# Patient Record
Sex: FEMALE | Race: WHITE | NOT HISPANIC OR LATINO | Employment: OTHER | URBAN - METROPOLITAN AREA
[De-identification: names, ages, dates, MRNs, and addresses within clinical notes are randomized per-mention and may not be internally consistent; named-entity substitution may affect disease eponyms.]

---

## 2017-01-03 ENCOUNTER — OFFICE VISIT (OUTPATIENT)
Dept: ONCOLOGY | Facility: CLINIC | Age: 82
End: 2017-01-03

## 2017-01-03 VITALS
HEART RATE: 90 BPM | OXYGEN SATURATION: 83 % | DIASTOLIC BLOOD PRESSURE: 75 MMHG | BODY MASS INDEX: 21.99 KG/M2 | TEMPERATURE: 98.2 F | HEIGHT: 60 IN | SYSTOLIC BLOOD PRESSURE: 125 MMHG | WEIGHT: 112 LBS | RESPIRATION RATE: 18 BRPM

## 2017-01-03 DIAGNOSIS — C34.81 MALIGNANT NEOPLASM OF OVERLAPPING SITES OF RIGHT LUNG (HCC): Primary | ICD-10-CM

## 2017-01-03 PROCEDURE — 99215 OFFICE O/P EST HI 40 MIN: CPT | Performed by: INTERNAL MEDICINE

## 2017-01-03 RX ORDER — LEVOFLOXACIN 500 MG/1
500 TABLET, FILM COATED ORAL DAILY
Qty: 7 TABLET | Refills: 0 | Status: SHIPPED | OUTPATIENT
Start: 2017-01-03 | End: 2017-01-09 | Stop reason: SDUPTHER

## 2017-01-03 NOTE — PROGRESS NOTES
Subjective     PROBLEM LIST:  1. [id number] Adenocarcinoma of the right lung  A) patient presented in December 2016 with shortness of breath.  Imaging showed a moderate pleural effusion, and 1300 ml were drained.  Cytology on 12/21/16 showed adenocarcinoma, consistent with a lung primary (TTF1 positive, mammoglobin negative).  2. Atrial fibrillation  3. CAD s/p 4v CABG  4. COPD  5. Mild dementia  6. GERD  7. Hyperlipidemia     CHIEF COMPLAINT: stage IV lung cancer      HISTORY OF PRESENT ILLNESS:  The patient is a 89 y.o. year old female, referred for evaluation of a malignant pleural effusion.    She was admitted to the hospital in December after 2-3 weeks of shortness of breath.  She has lost about 20 lbs over 18 months.  She initially was treated for pneumonia, but further imaging showed an effusion which was drained. She was discharged home on 12/21.  She was seen by Dr. Anthony in follow up on 12/30 - a CXR at that time showed minimal fluid.  A PET scan was ordered but has not yet been scheduled.  Over the weekend she has had a new cough with sputum production.  She says the cough is constant and this is her main complaint right now.  It is affecting her sleep at night and she has pain in her lower ribs from coughing so much.  Her O2 sat at presentation was in the 80s.    REVIEW OF SYSTEMS:  A 14 point review of systems was performed and is negative except as noted above.    Past Medical History   Diagnosis Date   • Atrial fibrillation    • BPV (benign positional vertigo)    • Breast cancer    • CAD (coronary artery disease) with prior MI    • HTN (hypertension)    • Hx of radiation therapy    • Hypercholesteremia        Current Outpatient Prescriptions on File Prior to Visit   Medication Sig Dispense Refill   • acetaminophen (TYLENOL) 325 MG tablet Take 325 mg by mouth Daily As Needed for mild pain (1-3).     • Calcium-Magnesium-Vitamin D (CALCIUM 500 PO) Take 1 tablet by mouth Daily.     • cetirizine  (zyrTEC) 10 MG tablet Take 10 mg by mouth Daily.     • cholecalciferol (VITAMIN D3) 1000 UNITS tablet Take 1,000 Units by mouth Daily.     • coenzyme Q10 100 MG capsule Take 100 mg by mouth Daily.     • diclofenac (VOLTAREN) 1 % gel gel Apply 4 g topically 4 (Four) Times a Day As Needed.     • digoxin (LANOXIN) 125 MCG tablet Take 125 mcg by mouth Daily.     • diltiaZEM CD (CARDIZEM CD) 120 MG 24 hr capsule Take 120 mg by mouth Daily As Needed.     • donepezil (ARICEPT) 10 MG tablet Take 10 mg by mouth Every Evening.     • furosemide (LASIX) 20 MG tablet Alternate 20mg po daily with 40mg po daily     • GARLIC OIL PO Take 1 tablet by mouth Daily.     • HYDROcodone-acetaminophen (NORCO) 5-325 MG per tablet Take 0.5 tablets by mouth Every 6 (Six) Hours As Needed.     • hydrOXYzine (ATARAX) 10 MG tablet Take 10 mg by mouth At Night As Needed.     • levalbuterol (XOPENEX) 0.63 MG/3ML nebulizer solution Take 1 ampule by nebulization 2 (Two) Times a Day.     • metoclopramide (REGLAN) 5 MG tablet Take 5 mg by mouth 4 (Four) Times a Day Before Meals & at Bedtime.     • mometasone (ASMANEX) 220 MCG/INH inhaler Inhale 1 puff 2 (Two) Times a Day.     • montelukast (SINGULAIR) 10 MG tablet Take 10 mg by mouth Every Night.     • Multiple Vitamins-Minerals (CENTRUM SILVER PO) Take 1 tablet by mouth Daily.     • Polyethylene Glycol 3350 (MIRALAX PO) Take  by mouth As Needed.     • potassium chloride (K-DUR) 10 MEQ CR tablet Take 10 mEq by mouth Daily.     • pravastatin (PRAVACHOL) 40 MG tablet Take 40 mg by mouth Every Night.     • raNITIdine (ZANTAC) 150 MG tablet Take 150 mg by mouth Daily As Needed for heartburn.     • rivaroxaban (XARELTO) 15 MG tablet Take 15 mg by mouth Every Evening.       No current facility-administered medications on file prior to visit.        Allergies   Allergen Reactions   • Benztropine    • Codeine    • Exelon [Rivastigmine Tartrate]    • Ipratropium Bromide Hfa    • Isosorbide Mononitrate  "[Isosorbide]    • Penicillins    • Prednisone    • Remeron [Mirtazapine]    • Stiolto Respimat [Tiotropium Bromide-Olodaterol]    • Sulfa Antibiotics    • Tramadol    • Trazodone And Nefazodone        Past Surgical History   Procedure Laterality Date   • Breast biopsy     • Breast lumpectomy     • Coronary artery bypass graft         Social History     Social History   • Marital status:      Spouse name: N/A   • Number of children: N/A   • Years of education: N/A     Social History Main Topics   • Smoking status: Former Smoker   • Smokeless tobacco: None   • Alcohol use 1.2 oz/week     2 Glasses of wine per week   • Drug use: No   • Sexual activity: Defer     Other Topics Concern   • None     Social History Narrative   25 pk yr smoking history, quit 15 yrs ago.  .  Retired.  Lives with her 2 sons.    Family History   Problem Relation Age of Onset   • Breast cancer Neg Hx    • Ovarian cancer Neg Hx    Father - MI    Objective     Visit Vitals   • /75   • Pulse 90   • Temp 98.2 °F (36.8 °C) (Temporal Artery )   • Resp 18   • Ht 60\" (152.4 cm)   • Wt 112 lb (50.8 kg)   • SpO2 (!) 83%   • BMI 21.87 kg/m2     Performance Status: 2  General: thin elderly female in no acute distress  Neuro: alert and oriented  HEENT: sclera anicteric, oropharynx clear  Lymphatics: no cervical, supraclavicular, or axillary adenopathy  Cardiovascular: regular rate and rhythm, no murmurs  Lungs: diffuse tight wheezing throughout  Abdomen: soft, nontender, nondistended.  No palpable organomegaly  Extremeties: no lower extremity edema  Skin: no rashes, lesions, bruising, or petechiae  Psych: mood and affect appropriate    Labs: CMP on 12/21/16 shows alk phos 124, Creatinine 0.6.  CBC hgb 11.6, plt 289.    Imaging: I personally reviewed the CT images of the chest.  This shows a moderate sized right pleural effusion.  There is collapse of the right lower lobe but no obvious mass otherwise.  The chest x-ray from 12/30/2016 " shows minimal fluid collection at the right base    Assessment/Plan     Sneha Montero is a 89 y.o. year old female with newly diagnosed stage IV adenocarcinoma of the lung.  I reviewed this diagnosis with her and her family today.  The stage IV designation is based on the malignant cells found in the pleural effusion.  There is no surgical option for management of this cancer, other than possible placement of a Pleurx catheter if the fluid reaccumulated rapidly.  In terms of management of the pleural effusion I think we will need to monitor and see how quickly this re-accumulates.  If it is rapid, I think a Pleurx would be the best option.  However if the fluid is slow to reaccumulate she could potentially have serial thoracentesis.    We discussed systemic therapy options.  Given her age I would be reluctant to consider using chemotherapy, as I think there is a very good chance he could make her quality of life worse than no treatment at all.  It is possible that her tumor has an EGFR, ALK, or ROS 1 abnormality; in that circumstance systemic therapy may be a reasonable option, but only if it helps to minimize symptoms related to the disease.  Her son asked about immune therapy.  We discussed that this is a treatment where these medications to activate the immune system to fight the cancer.  It can be told well tolerated in some cases but there are significant side effects to be aware of.  If there is sufficient tissue available from her pleural fluid, we will send molecular testing to determine if she has any of the above abnormalities.  Also in terms of completing her staging we will plan to do the PET scan as previously ordered, and had an MRI of the brain.      I will see her back in 2 weeks to review all of these results.    Her current symptoms of cough, wheezing may be related to bronchitis.  It is possible that fluid reaccumulation and/or lung atelectasis can cause the symptoms as well, but this seems  unlikely based on Fridays x-ray.  I prescribed a cough suppressant as well as levofloxacin.  She will call if her symptoms do not improve.           Gwen Cruz MD    1/3/2017

## 2017-01-03 NOTE — MR AVS SNAPSHOT
Sneha LENA Montero   1/3/2017 1:15 PM   Office Visit    Dept Phone:  [phone]   Encounter #:  [id number]    Provider:  Gwen Cruz MD   Department:  [facility] HEMATOLOGY  AND ONCOLOGY                Your Full Care Plan              Your Updated Medication List          This list is accurate as of: 1/3/17  3:11 PM.  Always use your most recent med list.                acetaminophen 325 MG tablet   Commonly known as:  TYLENOL       CALCIUM 500 PO       CENTRUM SILVER PO       cetirizine 10 MG tablet   Commonly known as:  zyrTEC       cholecalciferol 1000 UNITS tablet   Commonly known as:  VITAMIN D3       coenzyme Q10 100 MG capsule       diclofenac 1 % gel gel   Commonly known as:  VOLTAREN       digoxin 125 MCG tablet   Commonly known as:  LANOXIN       diltiaZEM  MG 24 hr capsule   Commonly known as:  CARDIZEM CD       donepezil 10 MG tablet   Commonly known as:  ARICEPT       furosemide 20 MG tablet   Commonly known as:  LASIX   Alternate 20mg po daily with 40mg po daily       GARLIC OIL PO       HYDROcod Polst-CPM Polst ER 10-8 MG/5ML ER suspension   Commonly known as:  TUSSIONEX PENNKINETIC ER   Take 5 mL by mouth Every 12 (Twelve) Hours As Needed (cough).       HYDROcodone-acetaminophen 5-325 MG per tablet   Commonly known as:  NORCO       hydrOXYzine 10 MG tablet   Commonly known as:  ATARAX       levalbuterol 0.63 MG/3ML nebulizer solution   Commonly known as:  XOPENEX       levoFLOXacin 500 MG tablet   Commonly known as:  LEVAQUIN   Take 1 tablet by mouth Daily.       metoclopramide 5 MG tablet   Commonly known as:  REGLAN       MIRALAX PO       mometasone 220 MCG/INH inhaler   Commonly known as:  ASMANEX       montelukast 10 MG tablet   Commonly known as:  SINGULAIR       potassium chloride 10 MEQ CR tablet   Commonly known as:  K-DUR       pravastatin 40 MG tablet   Commonly known as:  PRAVACHOL       raNITIdine 150 MG tablet   Commonly known as:  ZANTAC  "      rivaroxaban 15 MG tablet   Commonly known as:  XARELTO               Instructions     None    Patient Instructions History      MyChart Signup     [facility] [employer] allows you to send messages to your doctor, view your test results, renew your prescriptions, schedule appointments, and more. To sign up, go to [employer] and click on the Sign Up Now link in the New User? box. Enter your [employer] Activation Code exactly as it appears below along with the last four digits of your Social Security Number and your Date of Birth () to complete the sign-up process. If you do not sign up before the expiration date, you must request a new code.    [employer] Activation Code: [email or web address]  Expires: 2017  2:44 PM    If you have questions, you can email [email or web address] or call [phone] to talk to our [employer] staff. Remember, [employer] is NOT to be used for urgent needs. For medical emergencies, dial 911.               Other Info from Your Visit           Your appointments     Date & Time Provider Appointment Department    2017 11:45 AM DEIDRA Cruz MD FOLLOW UP [facility] HEMATOLOGY  AND ONCOLOGY    2017  1:45 PM EST RAD TECH [facility] Chest X-Ray [facility]    2017  2:00 PM DEIDRA Anthony MD Follow Up [facility]    Arrive 15 minutes prior to appointment.        [facility] HEMATOLOGY  AND ONCOLOGY  [place]  [address]  [facility] [address] [phone] [facility]  [address]  [facility] [address] [phone]          Vital Signs     Blood Pressure Pulse Temperature Respirations Height Weight    125/75 90 98.2 °F (36.8 °C) (Temporal Artery ) 18 60\" (152.4 cm) 112 lb (50.8 kg)    Oxygen Saturation Body Mass Index Smoking Status             83% " 21.87 kg/m2 Former Smoker         Problems and Diagnoses Noted     Malignant neoplasm of overlapping sites of right lung

## 2017-01-03 NOTE — LETTER
January 3, 2017     Timothy Ceabllos MD  [address]  [address]  [facility] [address]    Patient: Sneha Montero   YOB: 1920   Date of Visit: 1/3/2017       Dear Dr. Tucker MD:    Sneha Montero was in my office today. Below is a copy of my note.    If you have questions, please do not hesitate to call me. I look forward to following Sneha along with you.         Sincerely,        Gwen Cruz MD        CC: Cayetano Anthony MD      Subjective     PROBLEM LIST:  1. [id number] Adenocarcinoma of the right lung  A) patient presented in December 2016 with shortness of breath.  Imaging showed a moderate pleural effusion, and 1300 ml were drained.  Cytology on 12/21/16 showed adenocarcinoma, consistent with a lung primary (TTF1 positive, mammoglobin negative).  2. Atrial fibrillation  3. CAD s/p 4v CABG  4. COPD  5. Mild dementia  6. GERD  7. Hyperlipidemia     CHIEF COMPLAINT: stage IV lung cancer      HISTORY OF PRESENT ILLNESS:  The patient is a 89 y.o. year old female, referred for evaluation of a malignant pleural effusion.    She was admitted to the hospital in December after 2-3 weeks of shortness of breath.  She has lost about 20 lbs over 18 months.  She initially was treated for pneumonia, but further imaging showed an effusion which was drained. She was discharged home on 12/21.  She was seen by Dr. Anthony in follow up on 12/30 - a CXR at that time showed minimal fluid.  A PET scan was ordered but has not yet been scheduled.  Over the weekend she has had a new cough with sputum production.  She says the cough is constant and this is her main complaint right now.  It is affecting her sleep at night and she has pain in her lower ribs from coughing so much.  Her O2 sat at presentation was in the 80s.    REVIEW OF SYSTEMS:  A 14 point review of systems was performed and is negative except as noted above.    Past Medical History   Diagnosis Date   • Atrial fibrillation    • BPV  (benign positional vertigo)    • Breast cancer    • CAD (coronary artery disease) with prior MI    • HTN (hypertension)    • Hx of radiation therapy    • Hypercholesteremia        Current Outpatient Prescriptions on File Prior to Visit   Medication Sig Dispense Refill   • acetaminophen (TYLENOL) 325 MG tablet Take 325 mg by mouth Daily As Needed for mild pain (1-3).     • Calcium-Magnesium-Vitamin D (CALCIUM 500 PO) Take 1 tablet by mouth Daily.     • cetirizine (zyrTEC) 10 MG tablet Take 10 mg by mouth Daily.     • cholecalciferol (VITAMIN D3) 1000 UNITS tablet Take 1,000 Units by mouth Daily.     • coenzyme Q10 100 MG capsule Take 100 mg by mouth Daily.     • diclofenac (VOLTAREN) 1 % gel gel Apply 4 g topically 4 (Four) Times a Day As Needed.     • digoxin (LANOXIN) 125 MCG tablet Take 125 mcg by mouth Daily.     • diltiaZEM CD (CARDIZEM CD) 120 MG 24 hr capsule Take 120 mg by mouth Daily As Needed.     • donepezil (ARICEPT) 10 MG tablet Take 10 mg by mouth Every Evening.     • furosemide (LASIX) 20 MG tablet Alternate 20mg po daily with 40mg po daily     • GARLIC OIL PO Take 1 tablet by mouth Daily.     • HYDROcodone-acetaminophen (NORCO) 5-325 MG per tablet Take 0.5 tablets by mouth Every 6 (Six) Hours As Needed.     • hydrOXYzine (ATARAX) 10 MG tablet Take 10 mg by mouth At Night As Needed.     • levalbuterol (XOPENEX) 0.63 MG/3ML nebulizer solution Take 1 ampule by nebulization 2 (Two) Times a Day.     • metoclopramide (REGLAN) 5 MG tablet Take 5 mg by mouth 4 (Four) Times a Day Before Meals & at Bedtime.     • mometasone (ASMANEX) 220 MCG/INH inhaler Inhale 1 puff 2 (Two) Times a Day.     • montelukast (SINGULAIR) 10 MG tablet Take 10 mg by mouth Every Night.     • Multiple Vitamins-Minerals (CENTRUM SILVER PO) Take 1 tablet by mouth Daily.     • Polyethylene Glycol 3350 (MIRALAX PO) Take  by mouth As Needed.     • potassium chloride (K-DUR) 10 MEQ CR tablet Take 10 mEq by mouth Daily.     • pravastatin  "(PRAVACHOL) 40 MG tablet Take 40 mg by mouth Every Night.     • raNITIdine (ZANTAC) 150 MG tablet Take 150 mg by mouth Daily As Needed for heartburn.     • rivaroxaban (XARELTO) 15 MG tablet Take 15 mg by mouth Every Evening.       No current facility-administered medications on file prior to visit.        Allergies   Allergen Reactions   • Benztropine    • Codeine    • Exelon [Rivastigmine Tartrate]    • Ipratropium Bromide Hfa    • Isosorbide Mononitrate [Isosorbide]    • Penicillins    • Prednisone    • Remeron [Mirtazapine]    • Stiolto Respimat [Tiotropium Bromide-Olodaterol]    • Sulfa Antibiotics    • Tramadol    • Trazodone And Nefazodone        Past Surgical History   Procedure Laterality Date   • Breast biopsy     • Breast lumpectomy     • Coronary artery bypass graft         Social History     Social History   • Marital status:      Spouse name: N/A   • Number of children: N/A   • Years of education: N/A     Social History Main Topics   • Smoking status: Former Smoker   • Smokeless tobacco: None   • Alcohol use 1.2 oz/week     2 Glasses of wine per week   • Drug use: No   • Sexual activity: Defer     Other Topics Concern   • None     Social History Narrative   25 pk yr smoking history, quit 15 yrs ago.  .  Retired.  Lives with her 2 sons.    Family History   Problem Relation Age of Onset   • Breast cancer Neg Hx    • Ovarian cancer Neg Hx    Father - MI    Objective     Visit Vitals   • /75   • Pulse 90   • Temp 98.2 °F (36.8 °C) (Temporal Artery )   • Resp 18   • Ht 60\" (152.4 cm)   • Wt 112 lb (50.8 kg)   • SpO2 (!) 83%   • BMI 21.87 kg/m2     Performance Status: 2  General: thin elderly female in no acute distress  Neuro: alert and oriented  HEENT: sclera anicteric, oropharynx clear  Lymphatics: no cervical, supraclavicular, or axillary adenopathy  Cardiovascular: regular rate and rhythm, no murmurs  Lungs: diffuse tight wheezing throughout  Abdomen: soft, nontender, nondistended.  " No palpable organomegaly  Extremeties: no lower extremity edema  Skin: no rashes, lesions, bruising, or petechiae  Psych: mood and affect appropriate    Labs: CMP on 12/21/16 shows alk phos 124, Creatinine 0.6.  CBC hgb 11.6, plt 289.    Imaging: I personally reviewed the CT images of the chest.  This shows a moderate sized right pleural effusion.  There is collapse of the right lower lobe but no obvious mass otherwise.  The chest x-ray from 12/30/2016 shows minimal fluid collection at the right base    Assessment/Plan     Sneha Montero is a 89 y.o. year old female with newly diagnosed stage IV adenocarcinoma of the lung.  I reviewed this diagnosis with her and her family today.  The stage IV designation is based on the malignant cells found in the pleural effusion.  There is no surgical option for management of this cancer, other than possible placement of a Pleurx catheter if the fluid reaccumulated rapidly.  In terms of management of the pleural effusion I think we will need to monitor and see how quickly this re-accumulates.  If it is rapid, I think a Pleurx would be the best option.  However if the fluid is slow to reaccumulate she could potentially have serial thoracentesis.    We discussed systemic therapy options.  Given her age I would be reluctant to consider using chemotherapy, as I think there is a very good chance he could make her quality of life worse than no treatment at all.  It is possible that her tumor has an EGFR, ALK, or ROS 1 abnormality; in that circumstance systemic therapy may be a reasonable option, but only if it helps to minimize symptoms related to the disease.  Her son asked about immune therapy.  We discussed that this is a treatment where these medications to activate the immune system to fight the cancer.  It can be told well tolerated in some cases but there are significant side effects to be aware of.  If there is sufficient tissue available from her pleural fluid, we will send  molecular testing to determine if she has any of the above abnormalities.  Also in terms of completing her staging we will plan to do the PET scan as previously ordered, and had an MRI of the brain.      I will see her back in 2 weeks to review all of these results.    Her current symptoms of cough, wheezing may be related to bronchitis.  It is possible that fluid reaccumulation and/or lung atelectasis can cause the symptoms as well, but this seems unlikely based on Fridays x-ray.  I prescribed a cough suppressant as well as levofloxacin.  She will call if her symptoms do not improve.           Gwen Cruz MD    1/3/2017

## 2017-01-09 ENCOUNTER — DOCUMENTATION (OUTPATIENT)
Dept: ONCOLOGY | Facility: CLINIC | Age: 82
End: 2017-01-09

## 2017-01-09 ENCOUNTER — TELEPHONE (OUTPATIENT)
Dept: ONCOLOGY | Facility: CLINIC | Age: 82
End: 2017-01-09

## 2017-01-09 RX ORDER — LEVOFLOXACIN 500 MG/1
500 TABLET, FILM COATED ORAL DAILY
Qty: 7 TABLET | Refills: 0 | Status: SHIPPED | OUTPATIENT
Start: 2017-01-09 | End: 2017-01-27

## 2017-01-09 NOTE — TELEPHONE ENCOUNTER
----- Message from Giulia Mendez sent at 1/9/2017  1:30 PM EST -----  Regarding: WILVER-SYMPTOMS  Contact: [phone]  Patient's son called patient is hallucinating, extremely mental agitated, sleeplessness, numbness on back of knee, pain in ankle she is taking levinox and taking a steroid could this be side effects from these medications? If so should they be discontinued and given something different.

## 2017-01-09 NOTE — TELEPHONE ENCOUNTER
Spoke with patient's son, Asim. Told him after giving on call MD an update on symptoms Sneha is experiencing- he advised she go to the ER to be evaluated. It is difficult to manage these sx over the phone- whether it be medication related or disease progression. Asim also wanted to discuss reasoning behind patient being on certain meds and possibly make some adjustments. I told him I would let Dr. Cruz know all of this tomorrow when she comes in and it should be her to make any permanent changes in her medication list- he agrees. Son is in [place], but will make arrangements for his mom to go to the ER.

## 2017-01-09 NOTE — PROGRESS NOTES
Called in Levaquin rx to [facility] pharmacy. Electronic rxs currently not going through on Epic.

## 2017-01-09 NOTE — TELEPHONE ENCOUNTER
Spoke with daughter, Anyi. Refill for abx escribed to pharmacy. Told her she would have to come in and  the cough medication rx. She said they still have some left and will call when they run out.

## 2017-01-10 ENCOUNTER — TELEPHONE (OUTPATIENT)
Dept: ONCOLOGY | Facility: CLINIC | Age: 82
End: 2017-01-10

## 2017-01-10 NOTE — TELEPHONE ENCOUNTER
Spoke with daughter. Patient did not go to the ER last night. Daughter and son believed it was unnecessary and they could manage her at home, someone is there around the clock. Family believes it was the Levaquin interacting with another medication causing hallucinations. She took the last dose last night. The psych symptoms have improved today. I told them if it was the Levaquin causing the problem, it should continue getting better the longer she is off that med. Advised them that per Dr. Cruz if her cough hadn't improved with one full course of abx they didn't need to try any more. Told them to not take any more. If it was disease progression causing hallucinations and trouble with balance and ambulation- the upcoming scans would confirm that. Anyi said the open MRI of the head was scheduled, but the PET scan was not. The PET was ordered by Dr. Mcclure office. Daughter is having difficulty communicating with Dr Mcclure office and central scheduling to get the PET scheduled. I will touch base with their office and  Make sure that scan gets scheduled.  Pt has an apt with her PCP Dr Ceballos tomorrow. Advised family again, it is best if a physician can assess her-- and not deal with these symptoms over the phone.

## 2017-01-10 NOTE — TELEPHONE ENCOUNTER
----- Message from Irma Mathur sent at 1/10/2017 11:43 AM EST -----  Regarding: WILVER - SIDE EFFECTS   Contact: [phone]  SUMIT MORENO, DAUGHTER OF PATIENT CALLED  REGARDING THE MEDICATION LEVAQUIN AND SIDE EFFECTS.  PATIENTS COUGH IS BETTER BUT STILL HAS A LOT OF CONGESTION. SHE FINISHED THE LAST OF THE LEVAQUIN LAST NIGHT. WHAT SHE IS EXPERIENCING FOR THE LAST TWO AND A HALF DAYS, SHE IS PARANOID, AND HAVING HALLUCINATIONS , AND SOMETIMES THEY CAN GET HER BACK TO REALITY BUT SHE IS IMAGINING THINGS THAT ARE HAPPENING. THEY ARE WONDERING IF THE LEVAQUIN HAS INTERACTED WITH THE NARCOTIC MEDICATION AND COUGH MEDICINE. SHE IS MANAGEABLE BUT THIS IS ALSO SCARY FOR HER. THEY PICKED UP A NEW COURSE OF LEVAQUIN LAST NIGHT, BUT THEY ARE WANTING TO KNOW IF THERE IS SOMETHING DIFFERENT SINCE THEY FEEL THERE IS  SIDE EFFECTS. SHE ALSO ISN'T SLEEPING EITHER.

## 2017-01-12 ENCOUNTER — HOSPITAL ENCOUNTER (OUTPATIENT)
Dept: PET IMAGING | Facility: HOSPITAL | Age: 82
Discharge: HOME | End: 2017-01-12
Attending: INTERNAL MEDICINE

## 2017-01-12 ENCOUNTER — HOSPITAL ENCOUNTER (OUTPATIENT)
Dept: PET IMAGING | Facility: HOSPITAL | Age: 82
Discharge: HOME | End: 2017-01-12
Attending: INTERNAL MEDICINE | Admitting: INTERNAL MEDICINE

## 2017-01-12 DIAGNOSIS — C34.90 PRIMARY ADENOCARCINOMA OF LUNG, UNSPECIFIED LATERALITY (HCC): ICD-10-CM

## 2017-01-12 LAB — GLUCOSE BLDC GLUCOMTR-MCNC: 90 MG/DL (ref 70–130)

## 2017-01-12 PROCEDURE — 78815 PET IMAGE W/CT SKULL-THIGH: CPT

## 2017-01-12 PROCEDURE — 0 FLUDEOXYGLUCOSE F18 SOLUTION: Performed by: INTERNAL MEDICINE

## 2017-01-12 PROCEDURE — A9552 F18 FDG: HCPCS | Performed by: INTERNAL MEDICINE

## 2017-01-12 PROCEDURE — 82962 GLUCOSE BLOOD TEST: CPT

## 2017-01-12 RX ADMIN — FLUDEOXYGLUCOSE F18 1 DOSE: 300 INJECTION INTRAVENOUS at 14:32

## 2017-01-13 ENCOUNTER — TELEPHONE (OUTPATIENT)
Dept: ONCOLOGY | Facility: CLINIC | Age: 82
End: 2017-01-13

## 2017-01-13 ENCOUNTER — TELEPHONE (OUTPATIENT)
Dept: CARDIOLOGY | Facility: CLINIC | Age: 82
End: 2017-01-13

## 2017-01-13 NOTE — TELEPHONE ENCOUNTER
PET w/ widely metastatic cancer and recurrent pleural effusion.  Will order a CT guided thoracentesis for next week, get patient back Monday the 23rd and discuss pleurex. Discussed with patients daughter who verbalized understanding.    Cayetano Anthony MD  Pulmonology and Critical Care Medicine  01/13/17 3:45 PM  Electronically Signed

## 2017-01-13 NOTE — TELEPHONE ENCOUNTER
----- Message from Sharyn La sent at 1/13/2017  9:47 AM EST -----  Regarding: WILVER/ PULMONOLOGIST  SUMIT MORENO  HOME      PLEASE CALL SUMIT CONCERNING THIS PATIENT.  (HER MOM)     PLEASE TALK WITH DR PETTIT TO FIND A PULMONOLOGIST.

## 2017-01-13 NOTE — TELEPHONE ENCOUNTER
Patient and family are considering switching pulmonologist, but plan on staying in the same group.

## 2017-01-16 DIAGNOSIS — C34.81 MALIGNANT NEOPLASM OF OVERLAPPING SITES OF RIGHT LUNG (HCC): ICD-10-CM

## 2017-01-16 DIAGNOSIS — R91.8 LUNG INFILTRATE: Primary | ICD-10-CM

## 2017-01-18 ENCOUNTER — OFFICE VISIT (OUTPATIENT)
Dept: ONCOLOGY | Facility: CLINIC | Age: 82
End: 2017-01-18

## 2017-01-18 VITALS
RESPIRATION RATE: 14 BRPM | SYSTOLIC BLOOD PRESSURE: 123 MMHG | BODY MASS INDEX: 21.79 KG/M2 | WEIGHT: 111 LBS | HEIGHT: 60 IN | DIASTOLIC BLOOD PRESSURE: 83 MMHG | HEART RATE: 88 BPM | TEMPERATURE: 98.3 F

## 2017-01-18 DIAGNOSIS — C34.81 MALIGNANT NEOPLASM OF OVERLAPPING SITES OF RIGHT LUNG (HCC): Primary | ICD-10-CM

## 2017-01-18 PROCEDURE — 99214 OFFICE O/P EST MOD 30 MIN: CPT | Performed by: INTERNAL MEDICINE

## 2017-01-18 NOTE — PROGRESS NOTES
"      PROBLEM LIST:  1. [id number] Adenocarcinoma of the right lung  A) patient presented in December 2016 with shortness of breath. Imaging showed a moderate pleural effusion, and 1300 ml were drained. Cytology on 12/21/16 showed adenocarcinoma, consistent with a lung primary (TTF1 positive, mammoglobin negative).  2. Atrial fibrillation  3. CAD s/p 4v CABG  4. COPD  5. Mild dementia  6. GERD  7. Hyperlipidemia     Subjective     HISTORY OF PRESENT ILLNESS:   Sneha Montero returns for follow-up.   Since she was last here she had a PET scan and an MRI of the brain.  The family was contacted by Dr. Anthony regarding the PET scan.  This showed widespread metastatic disease with lesions in the liver as well as abdominal lymph nodes.  The family reports that she has declined over the past few weeks.  She seems much more weak.  She is having more difficulty with breathing.  Her scan showed reaccumulation of pleural effusions.    Past Medical History, Past Surgical History, Social History, Family History have been reviewed and are without significant changes except as mentioned.    Review of Systems   A comprehensive 14 point review of systems was performed and was negative except as mentioned.    Medications:  The current medication list was reviewed in the EMR    ALLERGIES:    Allergies   Allergen Reactions   • Benztropine    • Biaxin [Clarithromycin] Hallucinations   • Codeine    • Exelon [Rivastigmine Tartrate]    • Ipratropium Bromide Hfa    • Isosorbide Mononitrate [Isosorbide]    • Levaquin [Levofloxacin] Hallucinations   • Penicillins    • Prednisone    • Remeron [Mirtazapine]    • Stiolto Respimat [Tiotropium Bromide-Olodaterol]    • Sulfa Antibiotics    • Tramadol    • Trazodone And Nefazodone        Objective      Visit Vitals   • /83   • Pulse 88   • Temp 98.3 °F (36.8 °C)   • Resp 14   • Ht 60\" (152.4 cm)   • Wt 111 lb (50.3 kg)   • BMI 21.68 kg/m2        Performance Status: 3    General: Frail elderly " female in no acute distress  Neuro: alert and oriented, hard of hearing  HEENT: sclera anicteric  Cardiovascular: regular rate and rhythm, no murmurs  Lungs: Decreased breath sounds at the bases bilaterally        Imaging: PET/CT scan shows bilateral pleural effusions, numerous lesions throughout the liver, mesenteric nodes, iliac nodes, cecelia hepatis, and omentum.      Assessment/Plan   Sneha Montero is a 89 y.o. year old female with metastatic adenocarcinoma.  She presented with a pleural effusion and this was originally thought to be a lung primary.  However, additional imaging shows extensive disease in the abdomen raising the possibility of a GI primary.  The MRI of the brain was unremarkable.  Given the findings on PET scan she has much more advanced disease than originally appreciated.  In addition, her decline has become more noticeable to her and her family.  I do not think that systemic therapy would improve her quality of life.  I do not think that interventions to try to extend her life are appropriate.  I think we should see simply focus on symptom management.  It may be that drainage of the pleural fluid collections and possible Pleurx catheter placement would provide some relief for her.  Dr. Anthony has already arranged for therapeutic thoracentesis on Friday.  I discussed with her daughter that it may only be weeks before she passes away.  Whether Pleurx catheter placement would be helpful depends on her performance status and how she's feeling.  I recommended hospice care.  The family is not all in agreement about having hospice involved.  I will not schedule any follow-up but I am happy to help him in any way I can.  They know they can always call with questions or concerns.                  Visit time was 25 minutes, greater than 50% spent in counseling      Gwen Cruz MD  [facility] Hematology and Oncology    1/18/2017          CC:

## 2017-01-18 NOTE — LETTER
January 18, 2017     Timothy Ceballos MD  [address]  [address]  [facility] [address]    Patient: Sneha Montero   YOB: 1920   Date of Visit: 1/18/2017       Dear Dr. Tucker MD:    Sneha Montero was in my office today. Below is a copy of my note.    If you have questions, please do not hesitate to call me. I look forward to following Sneha along with you.         Sincerely,        Gwen Cruz MD        CC: Cayetano Anthony MD          PROBLEM LIST:  1. [id number] Adenocarcinoma of the right lung  A) patient presented in December 2016 with shortness of breath. Imaging showed a moderate pleural effusion, and 1300 ml were drained. Cytology on 12/21/16 showed adenocarcinoma, consistent with a lung primary (TTF1 positive, mammoglobin negative).  2. Atrial fibrillation  3. CAD s/p 4v CABG  4. COPD  5. Mild dementia  6. GERD  7. Hyperlipidemia     Subjective     HISTORY OF PRESENT ILLNESS:   Sneha Montero returns for follow-up.   Since she was last here she had a PET scan and an MRI of the brain.  The family was contacted by Dr. Anthony regarding the PET scan.  This showed widespread metastatic disease with lesions in the liver as well as abdominal lymph nodes.  The family reports that she has declined over the past few weeks.  She seems much more weak.  She is having more difficulty with breathing.  Her scan showed reaccumulation of pleural effusions.    Past Medical History, Past Surgical History, Social History, Family History have been reviewed and are without significant changes except as mentioned.    Review of Systems   A comprehensive 14 point review of systems was performed and was negative except as mentioned.    Medications:  The current medication list was reviewed in the EMR    ALLERGIES:    Allergies   Allergen Reactions   • Benztropine    • Biaxin [Clarithromycin] Hallucinations   • Codeine    • Exelon [Rivastigmine Tartrate]    • Ipratropium Bromide Hfa    • Isosorbide  "Mononitrate [Isosorbide]    • Levaquin [Levofloxacin] Hallucinations   • Penicillins    • Prednisone    • Remeron [Mirtazapine]    • Stiolto Respimat [Tiotropium Bromide-Olodaterol]    • Sulfa Antibiotics    • Tramadol    • Trazodone And Nefazodone        Objective      Visit Vitals   • /83   • Pulse 88   • Temp 98.3 °F (36.8 °C)   • Resp 14   • Ht 60\" (152.4 cm)   • Wt 111 lb (50.3 kg)   • BMI 21.68 kg/m2        Performance Status: 3    General: Frail elderly female in no acute distress  Neuro: alert and oriented, hard of hearing  HEENT: sclera anicteric  Cardiovascular: regular rate and rhythm, no murmurs  Lungs: Decreased breath sounds at the bases bilaterally        Imaging: PET/CT scan shows bilateral pleural effusions, numerous lesions throughout the liver, mesenteric nodes, iliac nodes, cecelia hepatis, and omentum.      Assessment/Plan   Sneha Montero is a 89 y.o. year old female with metastatic adenocarcinoma.  She presented with a pleural effusion and this was originally thought to be a lung primary.  However, additional imaging shows extensive disease in the abdomen raising the possibility of a GI primary.  The MRI of the brain was unremarkable.  Given the findings on PET scan she has much more advanced disease than originally appreciated.  In addition, her decline has become more noticeable to her and her family.  I do not think that systemic therapy would improve her quality of life.  I do not think that interventions to try to extend her life are appropriate.  I think we should see simply focus on symptom management.  It may be that drainage of the pleural fluid collections and possible Pleurx catheter placement would provide some relief for her.  Dr. Anthony has already arranged for therapeutic thoracentesis on Friday.  I discussed with her daughter that it may only be weeks before she passes away.  Whether Pleurx catheter placement would be helpful depends on her performance status and how " she's feeling.  I recommended hospice care.  The family is not all in agreement about having hospice involved.  I will not schedule any follow-up but I am happy to help him in any way I can.  They know they can always call with questions or concerns.                  Visit time was 25 minutes, greater than 50% spent in counseling      Gwen Cruz MD  [facility] Hematology and Oncology    1/18/2017          CC:

## 2017-01-19 ENCOUNTER — HOSPITAL ENCOUNTER (OUTPATIENT)
Dept: CT IMAGING | Facility: HOSPITAL | Age: 82
End: 2017-01-19

## 2017-01-20 ENCOUNTER — HOSPITAL ENCOUNTER (OUTPATIENT)
Dept: CT IMAGING | Facility: HOSPITAL | Age: 82
Discharge: HOME | End: 2017-01-20
Attending: INTERNAL MEDICINE | Admitting: INTERNAL MEDICINE

## 2017-01-20 ENCOUNTER — DOCUMENTATION (OUTPATIENT)
Dept: SOCIAL WORK | Facility: HOSPITAL | Age: 82
End: 2017-01-20

## 2017-01-20 VITALS
OXYGEN SATURATION: 92 % | RESPIRATION RATE: 24 BRPM | DIASTOLIC BLOOD PRESSURE: 86 MMHG | HEART RATE: 73 BPM | SYSTOLIC BLOOD PRESSURE: 143 MMHG | TEMPERATURE: 96.5 F

## 2017-01-20 DIAGNOSIS — R91.8 LUNG INFILTRATE: ICD-10-CM

## 2017-01-20 DIAGNOSIS — C34.81 MALIGNANT NEOPLASM OF OVERLAPPING SITES OF RIGHT LUNG (HCC): ICD-10-CM

## 2017-01-20 LAB
APTT PPP: 24.9 SECONDS (ref 24–31)
INR PPP: 0.91
PLATELET # BLD AUTO: 296 10*3/MM3 (ref 150–450)
PROTHROMBIN TIME: 9.9 SECONDS (ref 9.6–11.5)

## 2017-01-20 PROCEDURE — 75989 ABSCESS DRAINAGE UNDER X-RAY: CPT

## 2017-01-20 PROCEDURE — 85049 AUTOMATED PLATELET COUNT: CPT | Performed by: RADIOLOGY

## 2017-01-20 PROCEDURE — 85730 THROMBOPLASTIN TIME PARTIAL: CPT | Performed by: RADIOLOGY

## 2017-01-20 PROCEDURE — 85610 PROTHROMBIN TIME: CPT | Performed by: RADIOLOGY

## 2017-01-20 RX ORDER — VIT A/VIT C/VIT E/ZINC/COPPER 4296-226
CAPSULE ORAL
COMMUNITY

## 2017-01-20 RX ORDER — LIDOCAINE HYDROCHLORIDE 10 MG/ML
10 INJECTION, SOLUTION INFILTRATION; PERINEURAL ONCE
Status: COMPLETED | OUTPATIENT
Start: 2017-01-20 | End: 2017-01-20

## 2017-01-20 RX ORDER — ALPRAZOLAM 0.25 MG/1
0.25 TABLET ORAL
Status: DISCONTINUED | OUTPATIENT
Start: 2017-01-20 | End: 2017-01-20 | Stop reason: HOSPADM

## 2017-01-20 RX ORDER — SODIUM CHLORIDE 0.9 % (FLUSH) 0.9 %
1-10 SYRINGE (ML) INJECTION AS NEEDED
Status: DISCONTINUED | OUTPATIENT
Start: 2017-01-20 | End: 2017-01-20 | Stop reason: HOSPADM

## 2017-01-20 RX ADMIN — LIDOCAINE HYDROCHLORIDE 10 ML: 10 INJECTION, SOLUTION INFILTRATION; PERINEURAL at 10:05

## 2017-01-20 NOTE — PROGRESS NOTES
Continued Stay Note       Patient Name: Sneha Montero  MRN: [id number]  Today's Date: 1/20/2017    Admit Date: (Not on file)          Discharge Plan       01/20/17 1014    Case Management/Social Work Plan    Plan Social work spoke with Mrs. Montero's family about equipment needs including a hospital bed.  The family said that they have called Dr. Ceballos's office and that they may be considering Hospice.  Hospice would provide durable medical equipment for the patient when required.  The family said they do not require a hospital bed right now but thet are looking at the near future.    Patient/Family In Agreement With Plan yes              Discharge Codes     None            INDIANA Bello

## 2017-01-23 LAB
LAB AP CASE REPORT: NORMAL
Lab: NORMAL
PATH REPORT.FINAL DX SPEC: NORMAL

## 2017-01-27 ENCOUNTER — OFFICE VISIT (OUTPATIENT)
Dept: PULMONOLOGY | Facility: CLINIC | Age: 82
End: 2017-01-27

## 2017-01-27 VITALS
OXYGEN SATURATION: 93 % | TEMPERATURE: 97.3 F | WEIGHT: 112 LBS | RESPIRATION RATE: 16 BRPM | DIASTOLIC BLOOD PRESSURE: 65 MMHG | BODY MASS INDEX: 21.99 KG/M2 | SYSTOLIC BLOOD PRESSURE: 122 MMHG | HEIGHT: 60 IN | HEART RATE: 88 BPM

## 2017-01-27 DIAGNOSIS — R91.8 LUNG INFILTRATE: Primary | ICD-10-CM

## 2017-01-27 DIAGNOSIS — J91.0 MALIGNANT PLEURAL EFFUSION: ICD-10-CM

## 2017-01-27 PROCEDURE — 99214 OFFICE O/P EST MOD 30 MIN: CPT | Performed by: INTERNAL MEDICINE

## 2017-01-27 NOTE — LETTER
January 27, 2017     Timothy Ceballos MD  [address]  [address]  [facility] [address]    Patient: Sneha Montero   YOB: 1920   Date of Visit: 1/27/2017       Dear Dr. Tucker MD:    Sneha Montero was in my office today. Below are the relevant portions of my assessment and plan of care.       Impression & Plan    1)  Malignant effusion - discussed options with patient and family as this will likely re-occur in a couple weeks a) do nothing b) intermittent thoracentesis c) Pleur-x catheter.  We discussed the risks and benefits of each approach and they prefer a Pleur-X if and when the effusion reoccurs.  Will get a Chest x ray Thursday feb 9th.  If the effusion has reoccured at that point will plan on doing  A Pleurx.  She would need to hold xarelto a couple days before the procedure.    rtc 2 weeks for cxr    If you have questions, please do not hesitate to call me. I look forward to following Sneha along with you.         Sincerely,        Cayetano Anthony MD        CC: No Recipients

## 2017-01-27 NOTE — PROGRESS NOTES
CHIEF COMPLAINT: Followup for malignant pleural effusion.    HISTORY OF PRESENT ILLNESS: The patient is a pleasant 89-year-old white female with history of AFib, CAD, COPD, mild dementia, GERD, hyperlipidemia, who recently had a thoracentesis for right-sided pleural effusion and was found to have adenocarcinoma of undetermined primary origin. She subsequently had a PET scan with widespread abdominal metastasis. The lung did not appear to be primary. The pleural effusion has been recurrent. She has now had thoracentesis x2, once for around 1300 mL, once for nearly a liter. She returns to clinic today for followup. Her last drainage was on Friday, 09/20/2016. It appears that it takes around 2-3 weeks for the fluid to reaccumulate which for her does cause severe shortness of breath. Todays chest x-ray in clinic, there is no pleural effusion. Her breathing is doing pretty well. She has no complaint. She is comfortable. She is accompanied by her family. It sounds like right now they are taking care of her, but they plan on getting home hospice involved as things advance. Patient did see Dr. Gwen Cruz in the Oncology Clinic, and given her age and widespread metastasis, COMFORT CARE was essentially the treatment plan. She has no other complaints today.     Physical Exam   Constitutional: Oriented to person, place, and time.   Head: Normocephalic and atraumatic.   Nose: Nose normal.   Mouth/Throat: Oropharynx is clear and moist.   Eyes: Conjunctivae are normal.   Neck: No tracheal deviation present.   Cardiovascular: Normal rate, regular rhythm, normal heart sounds and intact distal pulses.  Exam reveals no gallop and no friction rub.    No murmur heard.  Pulmonary/Chest: Effort normal and breath sounds normal. No stridor. No respiratory distress. No wheezes. No rales. No tenderness.   Abdominal: Soft. Bowel sounds are normal. No distension. No tenderness. There is no guarding.   Musculoskeletal: Normal range of motion.  No edema.   Lymphadenopathy:  No cervical adenopathy.   Neurological: Alert and oriented to person, place, and time.  No Focal Neurological Deficits Observed   Skin: Skin is warm and dry. No rash noted.   Psychiatric: Normal mood and affect.  Behavior is normal. Judgment normal.     CXR - no pleural effusion, reviewed and interpreted by me    Impression & Plan    1)  Malignant effusion - discussed options with patient and family as this will likely re-occur in a couple weeks a) do nothing b) intermittent thoracentesis c) Pleur-x catheter.  We discussed the risks and benefits of each approach and they prefer a Pleur-X if and when the effusion reoccurs.  Will get a Chest x ray Thursday feb 9th.  If the effusion has reoccured at that point will plan on doing  A Pleurx.  She would need to hold xarelto a couple days before the procedure.    rtc 2 weeks for cxr    Cayetano Anthony MD  Pulmonology and Critical Care Medicine  01/27/17 3:28 PM  Electronically Signed

## 2017-01-27 NOTE — MR AVS SNAPSHOT
Sneha Montero   1/27/2017 2:00 PM   Office Visit    Dept Phone:  [phone]   Encounter #:  [id number]    Provider:  Cayetano Anthony MD   Department:  [facility]                Your Full Care Plan              Today's Medication Changes          These changes are accurate as of: 1/27/17  2:40 PM.  If you have any questions, ask your nurse or doctor.               Stop taking medication(s)listed here:     levoFLOXacin 500 MG tablet   Commonly known as:  LEVAQUIN   Stopped by:  Cayetano Anthony MD                      Your Updated Medication List          This list is accurate as of: 1/27/17  2:40 PM.  Always use your most recent med list.                acetaminophen 325 MG tablet   Commonly known as:  TYLENOL       CALCIUM 500 PO       * CENTRUM SILVER PO       * ICAPS capsule       cetirizine 10 MG tablet   Commonly known as:  zyrTEC       cholecalciferol 1000 UNITS tablet   Commonly known as:  VITAMIN D3       coenzyme Q10 100 MG capsule       diclofenac 1 % gel gel   Commonly known as:  VOLTAREN       digoxin 125 MCG tablet   Commonly known as:  LANOXIN       diltiaZEM  MG 24 hr capsule   Commonly known as:  CARDIZEM CD       donepezil 10 MG tablet   Commonly known as:  ARICEPT       furosemide 20 MG tablet   Commonly known as:  LASIX   Alternate 20mg po daily with 40mg po daily       GARLIC OIL PO       HYDROcod Polst-CPM Polst ER 10-8 MG/5ML ER suspension   Commonly known as:  TUSSIONEX PENNKINETIC ER   Take 5 mL by mouth Every 12 (Twelve) Hours As Needed (cough).       HYDROcodone-acetaminophen 5-325 MG per tablet   Commonly known as:  NORCO       hydrOXYzine 10 MG tablet   Commonly known as:  ATARAX       levalbuterol 0.63 MG/3ML nebulizer solution   Commonly known as:  XOPENEX       metoclopramide 5 MG tablet   Commonly known as:  REGLAN       MIRALAX PO       mometasone 220 MCG/INH inhaler   Commonly known as:   ASMANEX       montelukast 10 MG tablet   Commonly known as:  SINGULAIR       potassium chloride 10 MEQ CR tablet   Commonly known as:  K-DUR       pravastatin 40 MG tablet   Commonly known as:  PRAVACHOL       raNITIdine 150 MG tablet   Commonly known as:  ZANTAC       rivaroxaban 15 MG tablet   Commonly known as:  XARELTO       * Notice:  This list has 2 medication(s) that are the same as other medications prescribed for you. Read the directions carefully, and ask your doctor or other care provider to review them with you.            We Performed the Following     XR Chest PA & Lateral       You Were Diagnosed With        Codes Comments    Lung infiltrate    -  Primary ICD-10-CM: R91.8  ICD-9-CM: 793.19       Instructions     None    Patient Instructions History      Upcoming Appointments     Visit Type Date Time Department    CHEST X-RAY 2017  1:45 PM [facility]    FOLLOW UP 2017  2:00 PM [facility]    XR CHEST PA AND LATERAL 2017  1:50 PM [facility]      [employer] Signup     [employer] allows you to send messages to your doctor, view your test results, renew your prescriptions, schedule appointments, and more. To sign up, go to [employer] and click on the Sign Up Now link in the New User? box. Enter your Gum[employer]um Activation Code exactly as it appears below along with the last four digits of your Social Security Number and your Date of Birth () to complete the sign-up process. If you do not sign up before the expiration date, you must request a new code.    Gum[employer]um Activation Code: [email or web address]  Expires: 2017  5:34 AM    If you have questions, you can email [email or web address] or call [phone] to talk to our [employer] staff. Remember, [employer]um[employer]um is NOT to be used for urgent needs. For medical emergencies, dial 911.               Other Info from Your Visit           Allergies     Benztropine  Hallucinations    Biaxin  "[Clarithromycin]  Hallucinations    Codeine  Nausea Only    Exelon [Rivastigmine Tartrate]  Itching, Dizziness    Isosorbide Mononitrate [Isosorbide]  Myalgia    \"Felt bad all over\"    Levaquin [Levofloxacin]  Hallucinations    Remeron [Mirtazapine]  Hallucinations    Stiolto Respimat [Tiotropium Bromide-olodaterol]  Other (See Comments)    ineffective    Tramadol  Other (See Comments)    fatigue    Trazodone And Nefazodone  Other (See Comments)    Dizziness and decreased appetite    Ipratropium Bromide Hfa  Anxiety, Other (See Comments), Dizziness    Jittery, hands shook    Penicillins  Rash    Prednisone  Anxiety    Panic attacks    Sulfa Antibiotics  Rash      Reason for Visit     Breathing Problem           Vital Signs     Blood Pressure Pulse Temperature Respirations Height Weight    122/65 88 97.3 °F (36.3 °C) 16 60\" (152.4 cm) 112 lb (50.8 kg)    Oxygen Saturation Peak Flow Body Mass Index Smoking Status          93% 2 L/min 21.87 kg/m2 Former Smoker        Problems and Diagnoses Noted     Abnormal chest x-ray      Results     XR Chest PA & Lateral               "

## 2017-02-06 ENCOUNTER — LAB (OUTPATIENT)
Dept: PULMONOLOGY | Facility: CLINIC | Age: 82
End: 2017-02-06

## 2017-02-06 ENCOUNTER — DOCUMENTATION (OUTPATIENT)
Dept: PULMONOLOGY | Facility: CLINIC | Age: 82
End: 2017-02-06

## 2017-02-06 DIAGNOSIS — J90 PLEURAL EFFUSION: Primary | ICD-10-CM

## 2017-02-06 NOTE — PROGRESS NOTES
Briefly saw patient in clinic today after a PA/Lateral CXR to follow up malignant right pleural effusion.  Patient has been more SOA and feeling dizzy.  Her CXR was improved from 1/27, no evidence of recurrent right pleural effusion.  D/w patient and daughter, there isn't much I can do medically to improve her breathing without making things worse.  PE is possible but she is on xarelto making that unlikely, she has had severe side effects from steroids and antibiotics.  There is no edema on chest xray or peripheral edema so diuretics probably not helpful.  Most appropriate at this point is to give her some morphine and benzo's to help her dyspnea and anxiety.  Recommended home hospice, the referral has already been placed.  I wrote a prescription for some morphine and valium for now.  Will see again prn.    Cayetano Anthony MD  Pulmonology and Critical Care Medicine  02/06/17 2:13 PM  Electronically Signed

## 2018-01-01 NOTE — TELEPHONE ENCOUNTER
----- Message from Jennifer Finney sent at 1/9/2017 10:00 AM EST -----  Regarding: WILVER/MED. REFILL  Contact: [phone]  Paimartha daughter Gorge called saying the patient is only slowly getting better and is requesting a refill on patients antibiotic and cough medication. If you have any questions gorge can be reached at [phone]   The patient is a 33d Female complaining of fever.